# Patient Record
Sex: FEMALE | Race: WHITE
[De-identification: names, ages, dates, MRNs, and addresses within clinical notes are randomized per-mention and may not be internally consistent; named-entity substitution may affect disease eponyms.]

---

## 2018-01-11 ENCOUNTER — HOSPITAL ENCOUNTER (OUTPATIENT)
Dept: HOSPITAL 92 - LABBT | Age: 48
Discharge: HOME | End: 2018-01-11
Attending: OBSTETRICS & GYNECOLOGY
Payer: COMMERCIAL

## 2018-01-11 DIAGNOSIS — R10.31: ICD-10-CM

## 2018-01-11 DIAGNOSIS — N92.0: ICD-10-CM

## 2018-01-11 DIAGNOSIS — Z01.812: Primary | ICD-10-CM

## 2018-01-11 DIAGNOSIS — D25.9: ICD-10-CM

## 2018-01-11 LAB
HGB BLD-MCNC: 13.7 G/DL (ref 12–16)
MCH RBC QN AUTO: 30.2 PG (ref 27–31)
MCV RBC AUTO: 90.7 FL (ref 81–99)
PLATELET # BLD AUTO: 347 THOU/UL (ref 130–400)
PREGS CONTROL BACKGROUND?: (no result)
PREGS CONTROL BAR APPEAR?: YES
RBC # BLD AUTO: 4.54 MILL/UL (ref 4.2–5.4)
WBC # BLD AUTO: 6.2 THOU/UL (ref 4.8–10.8)

## 2018-01-11 PROCEDURE — 86900 BLOOD TYPING SEROLOGIC ABO: CPT

## 2018-01-11 PROCEDURE — 84703 CHORIONIC GONADOTROPIN ASSAY: CPT

## 2018-01-11 PROCEDURE — 85027 COMPLETE CBC AUTOMATED: CPT

## 2018-01-11 PROCEDURE — 86901 BLOOD TYPING SEROLOGIC RH(D): CPT

## 2018-01-11 PROCEDURE — 86850 RBC ANTIBODY SCREEN: CPT

## 2018-01-13 NOTE — HP
REASON FOR ADMISSION:  Menorrhagia and fibroids, status post endometrial ablation.

 

SCHEDULED PROCEDURE:  Total laparoscopic hysterectomy with right salpingo-oophorectomy and left salpi
ngectomy.

 

HISTORY OF PRESENT ILLNESS:  Ms. 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
 attendant.  She has had an endometrial ablation in the past and has persistent bleeding and menorrha
souleymane as well as pain from her fibroids.  Her fibroids enlarged to the point where they are pushing on 
her bladder and causing discomfort with intercourse.  She desires definitive surgical management.  Dandre troy also has a long history of right lower quadrant pain and desires prophylactic oophorectomy on the r
ight.

 

OB AND GYN HISTORY:   x2, SAB x1 G3, P2, negative Pap smear in , endometrial ablation in ,
 contraceptive is 's vasectomy.

 

PAST MEDICAL HISTORY:  Denies.

 

PAST SURGICAL HISTORY:  Ablation.

 

ALLERGIES:  Denies.

 

MEDICATIONS:  Indomethacin.

 

SOCIAL HISTORY:  Denies tobacco, alcohol, and IV drug abuse.

 

FAMILY HISTORY:  Noncontributory.

 

REVIEW OF SYSTEMS:  Noncontributory.

 

PHYSICAL EXAMINATION:

GENERAL:  White female in no acute distress.

VITAL SIGNS:  5 feet 6, 170, BMI 27, blood pressure 130/82.

HEENT:  Within normal limits.

LUNGS:  Clear to auscultation bilaterally.

HEART:  Regular rhythm.

BREASTS:  No masses bilaterally.

ABDOMEN:  Soft, nontender, no rebound or guarding.

PELVIC:  Vulva without lesions.  Vagina without discharge.  Cervix parous.  Uterus 14 weeks size with
 fundal fibroids on exam.  Adnexa without masses being able to be appreciated.

 

LABORATORY STUDIES:  Ultrasound reveals a uterus measuring 14 x 4 x 9.9 x 8 cm with fundal fibroids m
easuring 7 and 6 cm respectively and endometrial thickness of 6 mm, normal size right and left adnexa
 without free fluid.

 

IMPRESSION:  Persistent dysmenorrhea and menorrhagia, status post endometrial ablation on multiple le
iomyoma uteri.

 

PLAN:  Discussed with patient options.  The patient strongly desires laparoscopic hysterectomy.  Risk
 and benefits of morselization was discussed with patient.  We will plan on proceeding with total lap
aroscopic hysterectomy with right salpingo-oophorectomy.  Plan on placing the specimen into an abdomi
nal retrieval bag and pulling against the abdomen at the level of a Pfannenstiel incision and making 
in approximately 2-3 cm incision and morcellating the uterus and fibroids out through that incision. 
 The patient understands the possible need to proceed to abdominal procedure because of size and bulk
iness of the uterus as well as the regular risks of surgery including bleeding, infection, and damage
 to pelvic organs.  We will administer appropriate antibiotic and DVT prophylaxis.  Anticipate 22-adenike
r observation status.

## 2018-01-16 ENCOUNTER — HOSPITAL ENCOUNTER (INPATIENT)
Dept: HOSPITAL 92 - SDC | Age: 48
LOS: 1 days | Discharge: HOME | DRG: 743 | End: 2018-01-17
Attending: OBSTETRICS & GYNECOLOGY | Admitting: OBSTETRICS & GYNECOLOGY
Payer: COMMERCIAL

## 2018-01-16 VITALS — BODY MASS INDEX: 27.9 KG/M2

## 2018-01-16 DIAGNOSIS — N94.6: ICD-10-CM

## 2018-01-16 DIAGNOSIS — D25.9: Primary | ICD-10-CM

## 2018-01-16 DIAGNOSIS — N80.1: ICD-10-CM

## 2018-01-16 PROCEDURE — 0UT74ZZ RESECTION OF BILATERAL FALLOPIAN TUBES, PERCUTANEOUS ENDOSCOPIC APPROACH: ICD-10-PCS | Performed by: OBSTETRICS & GYNECOLOGY

## 2018-01-16 PROCEDURE — 8E0W4CZ ROBOTIC ASSISTED PROCEDURE OF TRUNK REGION, PERCUTANEOUS ENDOSCOPIC APPROACH: ICD-10-PCS | Performed by: OBSTETRICS & GYNECOLOGY

## 2018-01-16 PROCEDURE — 0UT04ZZ RESECTION OF RIGHT OVARY, PERCUTANEOUS ENDOSCOPIC APPROACH: ICD-10-PCS | Performed by: OBSTETRICS & GYNECOLOGY

## 2018-01-16 PROCEDURE — 0UT94ZZ RESECTION OF UTERUS, PERCUTANEOUS ENDOSCOPIC APPROACH: ICD-10-PCS | Performed by: OBSTETRICS & GYNECOLOGY

## 2018-01-16 PROCEDURE — 85027 COMPLETE CBC AUTOMATED: CPT

## 2018-01-16 PROCEDURE — 36415 COLL VENOUS BLD VENIPUNCTURE: CPT

## 2018-01-16 PROCEDURE — 88307 TISSUE EXAM BY PATHOLOGIST: CPT

## 2018-01-16 PROCEDURE — A4216 STERILE WATER/SALINE, 10 ML: HCPCS

## 2018-01-16 RX ADMIN — HYDROCODONE BITARTRATE AND ACETAMINOPHEN PRN TAB: 5; 325 TABLET ORAL at 17:01

## 2018-01-16 RX ADMIN — HYDROCODONE BITARTRATE AND ACETAMINOPHEN PRN TAB: 5; 325 TABLET ORAL at 21:21

## 2018-01-16 RX ADMIN — MORPHINE SULFATE PRN MG: 4 INJECTION, SOLUTION INTRAMUSCULAR; INTRAVENOUS at 14:28

## 2018-01-16 RX ADMIN — Medication SCH: at 21:19

## 2018-01-16 RX ADMIN — MORPHINE SULFATE PRN MG: 4 INJECTION, SOLUTION INTRAMUSCULAR; INTRAVENOUS at 16:24

## 2018-01-16 NOTE — OP
DATE OF OPERATION:  01/16/2018

 

PREOPERATIVE DIAGNOSES:  Large uterine fibroids, dysmenorrhea, pelvic pain, right lower quadrant.

 

POSTOPERATIVE DIAGNOSIS:  Large uterine fibroids, dysmenorrhea, pelvic pain, right lower quadrant, pl
us right ovarian endometriosis.

 

PROCEDURE:  Total laparoscopic hysterectomy, right salpingo-oophorectomy,  left salpingectomy and ext
ra-abdominal morselization.

 

SURGEON:  Pablo Willingham M.D.

 

ASSISTANT:  Sancho Cleaning D.O.

 

ANESTHESIA:  General endotracheal.

 

ESTIMATED BLOOD LOSS:  200 mL.

 

MEDICATIONS:  Two grams Ancef preincision.

 

DVT PROPHYLAXIS:  SCDs.

 

OPERATIVE FINDINGS:

1.  Approximately 14-16 week sized bulky uterus with 2 large fibroids in the fundus side by side.

2.  Mild to moderate amount of endometriosis on the surface of the right ovary.

3.  Normal appearing left ovary.

4.  Hemostasis with clear urine.

5.  Counts correct at the end of the procedure.

 

DISPOSITION:  To the recovery room in good condition.

 

DESCRIPTION OF OPERATIVE PROCEDURE:  After obtaining proper informed consent, the patient was taken t
o the operating room where general endotracheal anesthesia was achieved without difficulty.  The yamileth
ent was prepped and draped in dorsal lithotomy position in Mickey stirrups.  Side hand speculum placed
 in vagina.  The cervix was identified and grasped with tooth tenaculum.  Uterus sounded to 10 cm.  R
REBECA manipulator with a 3.5 vaginal  and 8 cm occluding obturator was placed without difficu
lty.  Porter catheter was placed.  Speculum and tenaculum removed.   changed his gloves and tu
rned his attention to the abdominal portion of the procedure.  Five mL of Marcaine was injected at th
e superior aspect of the umbilicus and a 12 mm skin incision made.  Veress needle placed inside the a
bdominal cavity.  Confirmation of entry into the peritoneal cavity via saline drop test.  Insufflatio
n carried out with carbon dioxide to a max pressure of 15, volume approximately 3.5 liters.  A 12 mm 
trocar was then placed and confirmation of entry into the peritoneal cavity without trauma to underly
ing viscera was noted with the laparoscopic camera.  Right and left lateral trocars for da Clau robo
t 8 mm were placed under direct visualization and an 11 mm assistant port in the right upper quadrant
 was placed.  The patient was placed in steep Trendelenburg position and the da Clau robot was docke
d with monopolar scissors in the right hand and fenestrated bipolar forceps in the left.  Uterus was 
noted to be large and bulky, but was able to be manipulated around for care.  On the patient's right,
 the infundibulopelvic ligament was identified and noted to be well away from the sidewall and coagul
ated at the level of the ovary.  After the IP was coagulated and transected, coagulation transected a
nd was taken to the broad and the round, and then to the cardinal ligaments down to just above the le
kayode of the internal cervical os.  The uterus was mobilized anteriorly.  The vesicouterine peritoneum 
was identified.  The vaginal  on the GUIDO was identified and incised sharply and dissected 
off the lower uterine segment, cervix, and upper vagina.  Coagulation and transection were carried ou
t at the level of the uterine vessels on the patient's right,  marked varicosities secondary to the p
attrey's large fibroids were noted which rendered some difficulty in this; however, it was accomplish
ed, sustained midline against the cervix well away from the pelvic side wall and the ureter.  After t
his was accomplished, attention was turned to the patient's left where the identical procedure was ca
rried out except on this side, the fallopian tube was identified, the mesosalpinx coagulated and aguilar
sected and excised and the tube pulled out through the assistant port.  Then, the utero-ovarian ligam
ent was coagulated and transected, carried to the broad, the round, and the cardinals down to just ab
ove the level of the internal cervical os.  Completion of the incision to the vesicouterine peritoneu
m anteriorly was carried out.  Skeletonization of the uterine vessels carried out, again marked varic
osities were encountered on this side.  Good hemostasis was achieved, maintaining midline discipline 
and staying away from the lateral sidewall and ureter.  Posteriorly, the vaginal  was ident
ified and the peritoneum incised sharply.  Posteriorly, the midline at 6 o'clock and extended from 6 
to 3 and from 6 to 9.  Anteriorly, good dissection of the bladder off the upper vagina was identified
, the incision was made which was carried down into the vagina at 12 o'clock and was extended from 12
 to 9, 12 to 3, 6 to 9 and 6 to 3, achieving hemostasis with bipolar coagulation as these pedicles we
re taken down.  Good hemostasis was noted along the margins of the cuff after removal of the cervix f
rom the upper vagina.  The specimen was large enough that it could not be passed out through the vagi
na.  The GUIDO manipulator obturator balloon was taken down and the specimen was pulled off of this an
d placed in the upper abdomen for future retrieval.  The applied medical morselization retrieval bag 
was placed in the vagina, tied in the middle with a silk suture and then the vagina occluded to maint
ain pneumoperitoneum.  Using a Stratafix 2-0 PDS the vagina was closed in a running continuous manner
 from right to left, achieving good hemostasis.  The bladder was back filled to identify its location
 and closure was careful to stay away from this.  Good hemostasis was noted after closure and the ped
icles were irrigated and noted be hemostatic.  FloSeal was applied upon all surfaces and good hemosta
sis was noted.  At this point in time, the closure suture on the morselization bag was cut in the mid
dle and removed.  The uterine specimen was placed into the retrieval bag and a 12 mm trocar was place
d suprapubically in the midline 2 cm above the symphysis pubis.  The retrieval straining on the juan
lization bag was pulled out through this trocar.  The da Clau instruments were then removed.  The da
 Clau undocked and laparoscopic trocars except for the 12 mm suprapubic were all removed.  The patie
nt was flattened and the incision at the level of the 12 mm trocar was extended to approximately 4 cm
 in greatest length  through the skin and carried down to the fascia which was incised sharply latera
lly with curved Lal scissors enlarging the incision.  The retrieval string and the mouth of the mors
elization bag were pulled out through this incision and a small Oj O was placed inside of it.  Th
e applied medical morselization bag was then pulled out through the middle of the Oj O and rolled
 down to facilitate morselization of the uterus.  A hard plastic protective ring was placed inside of
 this and the uterus was grasped and morcellated out in approximately 270 degree rotating technique u
sing an 11 blade and taking care to avoid trauma to the underlying viscera.  This procedure was pilo
ed out for approximately 45-50 minutes, completely removing the specimen, including right tube and ov
micky as well as the uterus.  The retrieval bag was pulled out.  The Oj O was left inside.  Inspect
ion of the pelvis was carried out.  Suction irrigation and blood from the surgical procedure were suc
tioned out and inspection of the surgical field through the 4 cm incision was carried out which revea
led good hemostasis.  No pooling of blood, no evidence of active hemorrhage.  The Oj 0 retractor 
was removed.  The fascia reapproximated at the level of the 4 cm incision using a 0 Vicryl on a CT2 n
eedle, UR-5 needle was used to close the fascia at the level of the umbilical trocar.  Skin was reapp
roximated x5 using subcuticular 4-0 Monocryl and Dermabond.  Inspection of the vagina afterwards reve
aled the cuff to be intact and no active bleeding.  Urine was clear.  The patient was awakened, extub
ated, and taken to the recovery room in good condition.

## 2018-01-17 VITALS — DIASTOLIC BLOOD PRESSURE: 71 MMHG | TEMPERATURE: 98.5 F | SYSTOLIC BLOOD PRESSURE: 118 MMHG

## 2018-01-17 LAB
HGB BLD-MCNC: 11.3 G/DL (ref 12–16)
MCH RBC QN AUTO: 30.6 PG (ref 27–31)
MCV RBC AUTO: 91.2 FL (ref 81–99)
PLATELET # BLD AUTO: 286 THOU/UL (ref 130–400)
RBC # BLD AUTO: 3.71 MILL/UL (ref 4.2–5.4)
WBC # BLD AUTO: 10.9 THOU/UL (ref 4.8–10.8)

## 2018-01-17 RX ADMIN — HYDROCODONE BITARTRATE AND ACETAMINOPHEN PRN TAB: 5; 325 TABLET ORAL at 03:31

## 2018-01-17 RX ADMIN — Medication SCH: at 07:50

## 2018-01-17 RX ADMIN — HYDROCODONE BITARTRATE AND ACETAMINOPHEN PRN TAB: 5; 325 TABLET ORAL at 08:04

## 2018-01-17 NOTE — DIS
DATE OF ADMISSION:  01/16/2018

 

DATE OF DISCHARGE:  01/17/2018

 

SUMMARY OF HOSPITAL COURSE:  Ms. Whittaker was admitted with an approximately 15-16 week size uterus
 with multiple leiomyoma, dysmenorrhea and menorrhagia.  She underwent a total laparoscopic hysterect
kina, right salpingo-oophorectomy, left salpingectomy with extra-abdominal morcellation with an approx
imately 200-300 mL estimated blood loss.  She had an unremarkable postoperative course with good urin
e output, afebrile with stable vital signs, hematocrit 33% postoperative day #01.  Abdomen was soft a
nd nontender without rebound or guarding.  Her incisions were well healed.  Her perineum was dry and 
her extremities without clubbing, cyanosis or edema.  She will be discharged home on Norco and Motrin
 and will follow up at [facility] [place] in 6 weeks.  Pathology pending.

## 2018-01-24 ENCOUNTER — HOSPITAL ENCOUNTER (INPATIENT)
Dept: HOSPITAL 92 - 3SE | Age: 48
LOS: 5 days | Discharge: HOME | DRG: 856 | End: 2018-01-29
Attending: OBSTETRICS & GYNECOLOGY | Admitting: OBSTETRICS & GYNECOLOGY
Payer: COMMERCIAL

## 2018-01-24 VITALS — BODY MASS INDEX: 25.5 KG/M2

## 2018-01-24 DIAGNOSIS — K65.1: ICD-10-CM

## 2018-01-24 DIAGNOSIS — Z90.79: ICD-10-CM

## 2018-01-24 DIAGNOSIS — Z90.710: ICD-10-CM

## 2018-01-24 DIAGNOSIS — T81.4XXA: Primary | ICD-10-CM

## 2018-01-24 DIAGNOSIS — L02.211: ICD-10-CM

## 2018-01-24 DIAGNOSIS — L76.32: ICD-10-CM

## 2018-01-24 DIAGNOSIS — Z90.721: ICD-10-CM

## 2018-01-24 DIAGNOSIS — R03.0: ICD-10-CM

## 2018-01-24 DIAGNOSIS — F41.9: ICD-10-CM

## 2018-01-24 DIAGNOSIS — B96.20: ICD-10-CM

## 2018-01-24 DIAGNOSIS — L03.311: ICD-10-CM

## 2018-01-24 LAB
ALBUMIN SERPL BCG-MCNC: 4.3 G/DL (ref 3.5–5)
ALP SERPL-CCNC: 263 U/L (ref 40–150)
ALT SERPL W P-5'-P-CCNC: 43 U/L (ref 8–55)
ANION GAP SERPL CALC-SCNC: 20 MMOL/L (ref 10–20)
AST SERPL-CCNC: 19 U/L (ref 5–34)
BILIRUB SERPL-MCNC: 0.4 MG/DL (ref 0.2–1.2)
BUN SERPL-MCNC: 12 MG/DL (ref 7–18.7)
CALCIUM SERPL-MCNC: 10.5 MG/DL (ref 7.8–10.44)
CHLORIDE SERPL-SCNC: 102 MMOL/L (ref 98–107)
CO2 SERPL-SCNC: 19 MMOL/L (ref 22–29)
CREAT CL PREDICTED SERPL C-G-VRATE: 105 ML/MIN (ref 70–130)
GLOBULIN SER CALC-MCNC: 4.4 G/DL (ref 2.4–3.5)
GLUCOSE SERPL-MCNC: 87 MG/DL (ref 70–105)
HGB BLD-MCNC: 12.6 G/DL (ref 12–16)
HYALINE CASTS #/AREA URNS LPF: (no result) LPF
MCH RBC QN AUTO: 30 PG (ref 27–31)
MCV RBC AUTO: 90.7 FL (ref 81–99)
MDIFF COMPLETE?: YES
PLATELET # BLD AUTO: 513 THOU/UL (ref 130–400)
PLATELET BLD QL SMEAR: (no result)
POTASSIUM SERPL-SCNC: 3.7 MMOL/L (ref 3.5–5.1)
RBC # BLD AUTO: 4.19 MILL/UL (ref 4.2–5.4)
RBC UR QL AUTO: (no result) HPF (ref 0–3)
SODIUM SERPL-SCNC: 137 MMOL/L (ref 136–145)
SP GR UR STRIP: 1.02 (ref 1–1.03)
WBC # BLD AUTO: 11.8 THOU/UL (ref 4.8–10.8)
WBC UR QL AUTO: (no result) HPF (ref 0–3)

## 2018-01-24 PROCEDURE — 87205 SMEAR GRAM STAIN: CPT

## 2018-01-24 PROCEDURE — 87076 CULTURE ANAEROBE IDENT EACH: CPT

## 2018-01-24 PROCEDURE — 85027 COMPLETE CBC AUTOMATED: CPT

## 2018-01-24 PROCEDURE — 87040 BLOOD CULTURE FOR BACTERIA: CPT

## 2018-01-24 PROCEDURE — 74177 CT ABD & PELVIS W/CONTRAST: CPT

## 2018-01-24 PROCEDURE — 87186 SC STD MICRODIL/AGAR DIL: CPT

## 2018-01-24 PROCEDURE — 80053 COMPREHEN METABOLIC PANEL: CPT

## 2018-01-24 PROCEDURE — A4216 STERILE WATER/SALINE, 10 ML: HCPCS

## 2018-01-24 PROCEDURE — 74178 CT ABD&PLV WO CNTR FLWD CNTR: CPT

## 2018-01-24 PROCEDURE — 87070 CULTURE OTHR SPECIMN AEROBIC: CPT

## 2018-01-24 PROCEDURE — 87086 URINE CULTURE/COLONY COUNT: CPT

## 2018-01-24 PROCEDURE — 85025 COMPLETE CBC W/AUTO DIFF WBC: CPT

## 2018-01-24 PROCEDURE — 87077 CULTURE AEROBIC IDENTIFY: CPT

## 2018-01-24 PROCEDURE — 87149 DNA/RNA DIRECT PROBE: CPT

## 2018-01-24 PROCEDURE — 85007 BL SMEAR W/DIFF WBC COUNT: CPT

## 2018-01-24 PROCEDURE — 80048 BASIC METABOLIC PNL TOTAL CA: CPT

## 2018-01-24 PROCEDURE — 87631 RESP VIRUS 3-5 TARGETS: CPT

## 2018-01-24 PROCEDURE — 81001 URINALYSIS AUTO W/SCOPE: CPT

## 2018-01-24 PROCEDURE — 36415 COLL VENOUS BLD VENIPUNCTURE: CPT

## 2018-01-24 RX ADMIN — HYDROCODONE BITARTRATE AND ACETAMINOPHEN PRN TAB: 5; 325 TABLET ORAL at 19:55

## 2018-01-24 RX ADMIN — METRONIDAZOLE SCH MLS: 500 INJECTION, SOLUTION INTRAVENOUS at 19:50

## 2018-01-24 RX ADMIN — HYDROCODONE BITARTRATE AND ACETAMINOPHEN PRN TAB: 5; 325 TABLET ORAL at 16:40

## 2018-01-24 NOTE — CT
CT ABDOMEN WITH AND WITHOUT CONTRAST 

CT PELVIS WITH AND WITHOUT CONTRAST:

(CT cystogram)

1/24/18

 

HISTORY: 

47-year-old female status post hysterectomy on 1/17/18. Urinary retention. 

 

The findings were discussed by telephone with Dr. Pablo Willingham by telephone at the time of this dict
ation. 

 

TECHNIQUE:  

IV contrast: 100 mL Isovue 370.

Noncontrast scan of entire abdomen and pelvis. 

After IV contrast injection, 90 second delayed, followed by four minute delayed, scans of entire abdo
men and pelvis. 

Bladder drained via existing Porter catheter, followed by post drainage scan of the pelvis. 

Coronal reconstructions of the 90 second delayed scan.

 

FINDINGS:  

The uterus is absent. Extensive fat stranding representing edema throughout the pelvic cavity. There 
is a very irregularly shaped collection of fluid and gas located centrally within the pelvic cavity, 
with irregular, enhancing walls. The dimensions are difficult to measure because of its very irregula
r shape. It is roughly 7 cm transverse x 2 cm anteroposterior x 2.5 cm craniocaudal. There are anteri
or and posterior extensions of this collection at its left side. There are adjacent much smaller tashia
ections of gas and fluid, some of which represent intraluminal contents within bowel loops, but some 
of which questionably represent additional extraluminal small extraluminal collections. 

 

There is an approximately 4.5 x 2 x 1.5 cm intermediate density fluid collection within the vaginal c
uff, with surrounding enhancing walls. 

 

There is a Porter catheter within an initially decompressed urinary bladder. On the four minute delaye
d images, the bladder partially fills with iodinated contrast. There is no evidence of extravasation 
of iodinated contrast seen on the four minute delayed images or in the post drainage images. 

 

There is an approximately 0.5 x 0.3 cm calculus at the lower pole of the left kidney adjacent to whic
h there is a lateral renal parenchymal defect representing scar from previous insult. 

 

There is no acute, currently active pyelonephritis. There is no hydroureteronephrosis. No calculus in
 the right kidney. The liver, abdominal aorta, right kidney, adrenals, pancreas, and spleen, are norm
al. No small bowel dilation. The small bowel and rectosigmoid colon within the pelvic cavity are diff
icult to evaluate because of the edematous changes throughout the pelvic cavity as described above. L
anahi bases are clear. 

 

Anterior to the inferior portion of the rectus abdominis muscle, in the subcutaneous fat, there is an
 approximately 2.5 x 1.5 x 4.5 cm collection without enhancing walls, but with surrounding fat strand
ing, and containing some gas. This is consistent with the area of induration at the site of surgical 
trocar insertion, and is consistent with a hematoma. It is uncertain whether or not this is infected.


 

IMPRESSION:  

1.      Status post recent hysterectomy. 

2.      Moderate sized, very irregularly shaped fluid collection centrally within the pelvic cavity, 
containing moderate amount of gas. This may represent postsurgical hematoma, but there is a possibili
ty that this could represent an abscess. 

3.      Much smaller collection in the anterior subcutaneous fat superficial to the lower portion of 
the rectus abdominis muscle, probably representing a superficial hematoma, which may or may not be in
fected, probably in the region of surgical trocar insertion.

4.      Although the bladder walls are diffusely thickened, there is no evidence of leakage; no evide
nce of iatrogenic traumatic injury to the urinary bladder or distal ureters. 

5.      Incidental finding of left renal lower pole calculus with adjacent renal parenchymal scar. 

 

 

Code CR

 

POS: RALPH

## 2018-01-24 NOTE — PRG
DATE OF SERVICE:  01/24/2018

 

TIME OF SERVICE:  1710 hours.

 

SUBJECTIVE:  A CT scan was reviewed by myself and discussed with Dr. De Dios.  The patient's lab work and
 vitals have been reviewed.  It is evident that the patient has a small irregular area that is most c
onsistent with an early abscess forming in a hematoma or collection of fluid in the pelvis posthyster
ectomy along with perhaps an early subcutaneous cellulitis/abscess at the level of her suprapubic inc
ision site.  She also incidentally has a left kidney stone associated with scarring that is evident o
f its longstanding presence there.  It is unlikely the kidney stone is contributing to her current is
sues.  Discussed with the patient and her  options including expectant management with IV anti
biotics versus surgically addressing and IV antibiotics.  Radiology does not think they can reach thi
s abscess percutaneously.  We will proceed with exploration, I&D at the level of her suprapubic site 
and then intra-abdominal pelvic washout tomorrow at noon.  We will allow the patient to eat this even
ing.  We will continue Porter drainage.  We will change antibiotics from Levaquin to Zosyn and Flagyl.
  The patient and  are understanding of this.  We will initiate SCDs for DVT prophylaxis.

## 2018-01-24 NOTE — HP
DATE OF ADMISSION:  2018

 

TIME OF ADMISSION:  Approximately 1300 hours.

 

REASON FOR ADMISSION:  Fever, possible UTI, possible postoperative complication, approximately 8 days
 status post total laparoscopic hysterectomy.

 

HISTORY OF PRESENT ILLNESS:  Ms. Whittaker is a 47-year-old  3, para 2, AB 1, who was admitte
d on 2018 for a TLH, RSO, left salpingectomy, and morselization of the uterus.  She had a surge
ry that was somewhat difficult secondary to the bulky nature of the fundus of her uterus as well as l
arge vessels supplying her fibroids with the uterines on both sides.  However, midline integrity was 
felt to have been maintained with the surgical procedure throughout and that she had uncomplicated 24
-hour observation in the hospital postoperatively.  She was discharged home and reports feeling good 
until approximately 4-5 days postoperatively, when she began to have fevers, chills, and vaginal pain
.  She also reports that she cannot hold her urine well, although she is able to hold her urine and i
s voiding.  She went to the emergency room in [place] and was seen and noted to have leukocytes with 
nitrites in her urine, white count of 15,000 with a left shift and was started on antibiotics.  I am 
uncertain of exactly what antibiotics at this time.  She reported on Monday to my office checked on h
er (2 days ago that she felt much better on the antibiotics); however, yesterday she began having fev
er again as well.  She was scheduled to come to the office this morning; however, I called the patien
t and discussed with her.  She lives over 2 hours away in [place] and we decided to go ahead and a
dmit her for further evaluation as I am concerned about the possibility of urinary tract injury, willian
cially thermal injury with this timing of the onset of this issue.

 

OB AND GYN HISTORY:   x2.  SAB x1.  Negative Pap in 2015.  Endometrial ablation in .

 

PAST MEDICAL HISTORY:  None.

 

PAST SURGICAL HISTORY:  Ablation and hysterectomy.

 

ALLERGIES:  Denies.

 

MEDICATIONS:  Delia.

 

SOCIAL HISTORY:  Denies tobacco, alcohol, or drug abuse.

 

FAMILY HISTORY:  Noncontributory.

 

REVIEW OF SYSTEMS:  Noncontributory.

 

PHYSICAL EXAMINATION:

GENERAL:  White female with moderate pain, but not in acute distress at this time.

VITAL SIGNS:  Temperature 99.0, pulse 84, respirations 20, blood pressure 154/90.

HEENT:  Within normal limits.

LUNGS:  Clear to auscultation bilaterally.

HEART:  Regular rhythm.

BREASTS:  No masses bilaterally.

ABDOMEN:  Soft and nontender without rebound or guarding, except in the area of her suprapubic remova
l of fibroids site where there is some induration and erythema at the superior aspect.  Bowel sounds 
are noted throughout.  The other trocars appeared normal.  The patient seems to have possibly mild CV
A tenderness on her right.

PELVIC:  Deferred.

EXTREMITIES:  Without clubbing, cyanosis or edema.

 

LABORATORY AND X-RAY FINDINGS:  White count is 11.8 with hematocrit of 38 (postoperative day #1 was 3
4), platelet count is elevated at 513, neutrophil are 75%, 6% bands, 13% lymphocytes.  Comprehensive 
metabolic panel is remarkable only for an elevated alkaline phosphate at 263, elevated total serum pr
otein at 8.7, upper limits of normal at 8.3 and albumin globulin ratio of 1.0.  Globulin is elevated 
at 4.4, upper limits of normal at 3.5.  Urinalysis from cath reveals 40+ ketones, trace blood, small 
bilirubin, negative nitrates, negative leukocyte esterase, negative wbc's, negative protein, specific
 gravity of 1.025 and a pH of 6.5.

 

IMAGING:  CT scan is pending at this time.  Both a CT urogram and cystogram were ordered.

 

IMPRESSION AND PLAN:  Fever of uncertain origin approximately 8 days post-surgical.  Initial consider
ation was worry for urinary tract injury.  Current laboratory values seemed to be less suggestive of 
this, although CT scan results are pending.  The possibility of cellulitis, pelvic abscess, influenza
, or other cause of fever and malaise need to be considered.  The patient had minimal blood loss and 
no significant active bleeding noted at the time of closure and does not have low enough hematocrit s
uggested the patient has continued intraabdominal hemorrhage.  We will follow up CT results, I have s
tarted the patient on Levaquin assuming urinary tract source of the infection, we will consult Urolog
y if CT findings indicate a possible urologic injury, and otherwise will further evaluate laboratory 
and CT results.  Urine cultures and blood cultures have been sent.

## 2018-01-25 PROCEDURE — 0J980ZZ DRAINAGE OF ABDOMEN SUBCUTANEOUS TISSUE AND FASCIA, OPEN APPROACH: ICD-10-PCS | Performed by: OBSTETRICS & GYNECOLOGY

## 2018-01-25 PROCEDURE — 0W9G0ZZ DRAINAGE OF PERITONEAL CAVITY, OPEN APPROACH: ICD-10-PCS | Performed by: OBSTETRICS & GYNECOLOGY

## 2018-01-25 RX ADMIN — METRONIDAZOLE SCH MLS: 500 INJECTION, SOLUTION INTRAVENOUS at 05:18

## 2018-01-25 RX ADMIN — Medication SCH: at 09:09

## 2018-01-25 RX ADMIN — MORPHINE SULFATE PRN MG: 5 INJECTION, SOLUTION INTRAMUSCULAR; INTRAVENOUS at 19:06

## 2018-01-25 RX ADMIN — MORPHINE SULFATE PRN MG: 5 INJECTION, SOLUTION INTRAMUSCULAR; INTRAVENOUS at 17:21

## 2018-01-25 RX ADMIN — HYDROCODONE BITARTRATE AND ACETAMINOPHEN PRN TAB: 5; 325 TABLET ORAL at 19:50

## 2018-01-25 RX ADMIN — METRONIDAZOLE SCH: 500 INJECTION, SOLUTION INTRAVENOUS at 14:26

## 2018-01-25 RX ADMIN — Medication SCH: at 23:29

## 2018-01-25 RX ADMIN — METRONIDAZOLE SCH MLS: 500 INJECTION, SOLUTION INTRAVENOUS at 19:52

## 2018-01-25 NOTE — OP
DATE OF PROCEDURE:  01/25/2018

 

PREOPERATIVE DIAGNOSES:  Suprapubic midline incision cellulitis with hematoma and pelvic abscess/hema
kristian, 8-day status post total laparoscopic hysterectomy.

 

POSTOPERATIVE DIAGNOSES:  Suprapubic midline incision cellulitis with hematoma and pelvic abscess/hem
atoma, 8-day status post total laparoscopic hysterectomy.

 

PROCEDURE PERFORMED:  Exploration with I&D of subcutaneous abscess and pelvic washout of abscess.

 

SURGEON:  Pablo Willingham M.D.

 

ASSISTANT:  Britta Fields M.D.

 

ANESTHESIA:  Pj Carty M.D.

 

ESTIMATED BLOOD LOSS:  50 mL intraoperatively.

 

COMPLICATIONS:  None.

 

DRAINS:  Porter to gravity.

 

DVT PROPHYLAXIS:  SCDs.

 

CULTURES:  Subcutaneous abscess x1 and pelvic abscess x1.

 

FINDINGS:

1.  Small 1-2 cm diameter pocket of old blood with purulent appearance, deep subcutaneous just above 
the suprapubic incision above the fascia cultured.

2.  Mildly loculated pelvic fluid collection with serosanguineous appearance with some purulence evac
uated.

3.  Hemostasis with correct counts at the end of the procedure.

 

DISPOSITION:  To the recovery room in good condition.

 

DESCRIPTION OF OPERATIVE PROCEDURE:  After obtaining proper informed consent, the patient was taken t
o the operating room.  Porter catheter was in situ.  The patient was prepped and draped and Dermabond 
was removed prior to prep from the skin at the level of the suprapubic incision.  This incision was o
pened up.  Of note, it was noted to be well healing and was difficult to open, it was opened up along
 its entirety and taken down to the level of fascia just above the fascia.  Superiorly, there was a p
ocket of dark fluid with purulent appearance to it.  This was cultured and evacuated.  The fascial cl
osure from the previous surgery was identified with Vicryl suture and the suture removed.  Fascia ope
caleb and grasped with Oschner clamps on each edge.  The peritoneum was identified, elevated and incise
d sharply entering the peritoneal cavity and taking care to avoid trauma to the underlying viscera.  
A small Oj O retractor was placed inside.   placed the patient in slight Trendelenburg po
sition and I used fingers to push the bowel back out of the way.  Loculations of fluid collection of 
serosanguineous fluid with slight odor and purulent appearance was noted in the pelvis, this was brok
en up and irrigation carried out with approximately 3 liters of saline after obtaining a culture from
 this area.  The left adnexa were identified and noted to be without evidence of gross infection or s
ignificant inflammation.  No evidence of bowel injury was noted and at no point in time was any fecal
 material noted during the suction irrigation or inspection cycle.  After this was carried out, no ev
idence of active bleeding in the pelvis was identified.  At this point in time, the Oj O retracto
r was removed.  Consideration was made towards placement of MANDO drain in the pelvis; however, decision
 was made because of very small size of the incision and the difficulty to access, and quick clearing
 of the fluid without evidence of extensive infection, to not place a MANDO drain.  The Oj O retract
or was removed and the rectus was inspected and noted to be dry.  The fascia was reapproximated in ru
nning continuous 0 PDS suture.  The subcutaneous tissue was irrigated and rendered hemostatic using B
ovie cautery.  Because of the infection that have been present in this area, decision was made to not
 close the subcu and also not to use a subcuticular stitch with Dermabond.  Instead, staples were use
d with intervening Telfa caleb placed with a 4x4, sponge dressing placed after this.  Urine was noted
 to be clear afterwards.  The patient was awakened, extubated, and taken to recovery room in good con
dition.  We will continue Zosyn and Flagyl as previously prescribed for the patient.

## 2018-01-26 RX ADMIN — HYDROCODONE BITARTRATE AND ACETAMINOPHEN PRN TAB: 5; 325 TABLET ORAL at 11:15

## 2018-01-26 RX ADMIN — HYDROCODONE BITARTRATE AND ACETAMINOPHEN PRN TAB: 5; 325 TABLET ORAL at 16:05

## 2018-01-26 RX ADMIN — Medication SCH: at 08:08

## 2018-01-26 RX ADMIN — METRONIDAZOLE SCH MLS: 500 INJECTION, SOLUTION INTRAVENOUS at 20:43

## 2018-01-26 RX ADMIN — HYDROCODONE BITARTRATE AND ACETAMINOPHEN PRN TAB: 5; 325 TABLET ORAL at 20:45

## 2018-01-26 RX ADMIN — METRONIDAZOLE SCH MLS: 500 INJECTION, SOLUTION INTRAVENOUS at 04:00

## 2018-01-26 RX ADMIN — HYDROCODONE BITARTRATE AND ACETAMINOPHEN PRN TAB: 5; 325 TABLET ORAL at 05:50

## 2018-01-26 RX ADMIN — METRONIDAZOLE SCH MLS: 500 INJECTION, SOLUTION INTRAVENOUS at 11:35

## 2018-01-26 RX ADMIN — HYDROCODONE BITARTRATE AND ACETAMINOPHEN PRN TAB: 5; 325 TABLET ORAL at 00:01

## 2018-01-26 NOTE — PRG
DATE OF SERVICE:  01/26/2018

 

TIME OF SERVICE:  1140 hours.

 

SUBJECTIVE:  The patient reports her pain is much improved.  She feels much better, has a good appeti
te and is voiding with ease.  Vitals reveal a T-max of 99.6, pulse 68, respirations 16, blood pressur
e 107/68.  The patient's urine output with Porter prior to removal was 2500 mL.  It has now been remov
ed and she is voiding with ease.  She reports no vaginal discharge.

 

PHYSICAL EXAMINATION:

GENERAL:  Patient is resting comfortably in no apparent distress.

ABDOMEN:  Soft and nontender, no rebound or guarding.  Active bowel sounds.  Bandage is in situ which
 are still in place in her suprapubic incision.  There is no increase in the size of the serosanguine
ous fluid area on the 4x4's from greater than 12 hours ago.  Perineum is dry.

EXTREMITIES:  Without clubbing, cyanosis, or edema.

 

LABORATORY DATA:  Both incisional and abdominal cultures are preliminary rare to moderate gram-negati
ve rods, which is consistent with suspected bacterial milieu with her post hysterectomy abscess.

 

IMPRESSION:  Status post incisional and pelvic hematoma/abscess approximately 1-1/2 weeks post total 
laparoscopic hysterectomy.

 

PLAN:  Continue Zosyn and Flagyl, initiate Colace for constipation.  We will remove Telfa caleb from 
incision tomorrow.  We will recheck CBC and metabolic panel tomorrow.

## 2018-01-27 LAB
ANION GAP SERPL CALC-SCNC: 12 MMOL/L (ref 10–20)
BASOPHILS # BLD AUTO: 0.1 THOU/UL (ref 0–0.2)
BASOPHILS NFR BLD AUTO: 0.7 % (ref 0–1)
BUN SERPL-MCNC: 7 MG/DL (ref 7–18.7)
CALCIUM SERPL-MCNC: 8.9 MG/DL (ref 7.8–10.44)
CHLORIDE SERPL-SCNC: 102 MMOL/L (ref 98–107)
CO2 SERPL-SCNC: 27 MMOL/L (ref 22–29)
CREAT CL PREDICTED SERPL C-G-VRATE: 126 ML/MIN (ref 70–130)
EOSINOPHIL # BLD AUTO: 0.1 THOU/UL (ref 0–0.7)
EOSINOPHIL NFR BLD AUTO: 1.4 % (ref 0–10)
GLUCOSE SERPL-MCNC: 94 MG/DL (ref 70–105)
HGB BLD-MCNC: 9.9 G/DL (ref 12–16)
LYMPHOCYTES # BLD: 1.6 THOU/UL (ref 1.2–3.4)
LYMPHOCYTES NFR BLD AUTO: 14.9 % (ref 21–51)
MCH RBC QN AUTO: 29.5 PG (ref 27–31)
MCV RBC AUTO: 91.1 FL (ref 81–99)
MONOCYTES # BLD AUTO: 0.5 THOU/UL (ref 0.11–0.59)
MONOCYTES NFR BLD AUTO: 5 % (ref 0–10)
NEUTROPHILS # BLD AUTO: 8.4 THOU/UL (ref 1.4–6.5)
NEUTROPHILS NFR BLD AUTO: 78.1 % (ref 42–75)
PLATELET # BLD AUTO: 527 THOU/UL (ref 130–400)
POTASSIUM SERPL-SCNC: 4 MMOL/L (ref 3.5–5.1)
RBC # BLD AUTO: 3.34 MILL/UL (ref 4.2–5.4)
SODIUM SERPL-SCNC: 137 MMOL/L (ref 136–145)
WBC # BLD AUTO: 10.7 THOU/UL (ref 4.8–10.8)

## 2018-01-27 RX ADMIN — HYDROCODONE BITARTRATE AND ACETAMINOPHEN PRN TAB: 5; 325 TABLET ORAL at 06:37

## 2018-01-27 RX ADMIN — HYDROCODONE BITARTRATE AND ACETAMINOPHEN PRN TAB: 5; 325 TABLET ORAL at 20:16

## 2018-01-27 RX ADMIN — METRONIDAZOLE SCH MLS: 500 INJECTION, SOLUTION INTRAVENOUS at 11:36

## 2018-01-27 RX ADMIN — Medication SCH: at 00:11

## 2018-01-27 RX ADMIN — HYDROCODONE BITARTRATE AND ACETAMINOPHEN PRN TAB: 5; 325 TABLET ORAL at 01:14

## 2018-01-27 RX ADMIN — MORPHINE SULFATE PRN MG: 5 INJECTION, SOLUTION INTRAMUSCULAR; INTRAVENOUS at 17:10

## 2018-01-27 RX ADMIN — HYDROCODONE BITARTRATE AND ACETAMINOPHEN PRN TAB: 5; 325 TABLET ORAL at 13:09

## 2018-01-27 RX ADMIN — METRONIDAZOLE SCH MLS: 500 INJECTION, SOLUTION INTRAVENOUS at 04:25

## 2018-01-27 RX ADMIN — Medication SCH: at 12:48

## 2018-01-27 RX ADMIN — METRONIDAZOLE SCH MLS: 500 INJECTION, SOLUTION INTRAVENOUS at 20:28

## 2018-01-28 RX ADMIN — HYDROCODONE BITARTRATE AND ACETAMINOPHEN PRN TAB: 5; 325 TABLET ORAL at 18:17

## 2018-01-28 RX ADMIN — Medication SCH: at 06:21

## 2018-01-28 RX ADMIN — HYDROCODONE BITARTRATE AND ACETAMINOPHEN PRN TAB: 5; 325 TABLET ORAL at 22:00

## 2018-01-28 RX ADMIN — HYDROCODONE BITARTRATE AND ACETAMINOPHEN PRN TAB: 5; 325 TABLET ORAL at 00:46

## 2018-01-28 RX ADMIN — Medication SCH: at 11:03

## 2018-01-28 RX ADMIN — HYDROCODONE BITARTRATE AND ACETAMINOPHEN PRN TAB: 5; 325 TABLET ORAL at 11:06

## 2018-01-28 RX ADMIN — METRONIDAZOLE SCH MLS: 500 INJECTION, SOLUTION INTRAVENOUS at 04:10

## 2018-01-28 RX ADMIN — METRONIDAZOLE SCH MLS: 500 INJECTION, SOLUTION INTRAVENOUS at 13:36

## 2018-01-28 NOTE — PRG
DATE OF SERVICE:  01/27/2018

 

TIME OF SERVICE:  1415 hours.

 

SUBJECTIVE:  The patient states that she feels about the same as she did yesterday.  She is somewhat 
anxious because of recent fever.  She denies nausea, vomiting or increased abdominal pain.

 

PHYSICAL EXAMINATION:

VITAL SIGNS:  The patient had a T-max of 100.1, now 99.5 without Tylenol.  Vital signs are otherwise 
unremarkable except from occasionally elevated systolic blood pressures.

GENERAL:  White female in no acute distress.

ABDOMEN:  Soft and nontender, bowel sounds are positive.  Her incision is intact and dry.

PELVIC:  The caleb were examined and there were no purulent, but were also noted to be dry and so the
y were removed without difficulty.  No purulent material or bleeding was noted after removal of the w
icks.  The balloon was intact.

EXTREMITIES:  Without clubbing, cyanosis or edema.

 

LABORATORY STUDIES:  Patient's white count is down to 10.7 from 11.0 on admission, 78% neutrophils, w
hich is essentially stable with admission, lymphocytes at 15%, which is essentially stable as well.  
She continues to have her reactive thrombocytosis.  Her hematocrit is 30.4% with hemoglobin of 9.9.  
Base met is within normal limits with a normal creatinine, potassium and sodium.  Glucoses within nor
mal limits as well.  Microbiology:  The patient with pelvic abscess and abdominal wound both grew E. 
coli along with non-hemolytic strep.  The E. coli is sensitive to current antibiotic therapy, piperac
illin/tazobactam.  Anaerobics are pending.

 

IMPRESSION:  Status post pelvic abscess with washout post total laparoscopic hysterectomy.  First fev
er during hospitalization approximately 48 hours post pelvic washout with appropriate antibiotic cove
rage with both Zosyn and Flagyl.

 

PLAN:  Continue antibiotics.  We will administer both Xanax for anxiety and clonidine p.r.n. for bloo
d pressure.  We will continue antibiotics and anticipate a repeat CT scan on the 01/29/2018 if the pa
tient remains afebrile prior to discharge to confirm resolution of abscess.

## 2018-01-28 NOTE — PRG
DATE OF SERVICE:  01/28/2018

 

TIME OF SERVICE:  12:50

 

SUBJECTIVE:  The patient reports that she feels much better today.  Small episode of bleeding after c
oughing after we removed the _____ yesterday.  It was addressed by Dr. Wagner, OB Hospitalist, on ca
ll with pressure and Steri-Strips.  The patient states that she has had no more bleeding, has a good 
appetite and only a small amount of vaginal discharge.

 

OBJECTIVE:

VITAL SIGNS:  T-max has been 100.1, and the patient has remained afebrile since noon on 01/27/2018.  
Her blood pressure remains elevated and currently is 180/89.

ABDOMEN:  Soft and nontender without rebound or guarding.  The suprapubic incision is dry.  There is 
moderate induration around it without significant erythema and no fluctuance.

EXTREMITIES:  Her extremities without clubbing, cyanosis, or edema.

 

LABORATORY DATA:  Culture is essentially unchanged since yesterday with anaerobic pending.

 

IMPRESSION:  The patient is doing well now approximately 12 days post-hysterectomy and 3 days post-pe
lvic washout with postoperative infection and abscess, on Zosyn and Flagyl.

 

PLAN:

1.  Continue antibiotics.

2.  Begin Bystolic 5 mg p.o. daily.  The patient reports that she has been having mildly elevated blo
od pressures even antecedent to this illness at home, and has a strong family history of elevated blo
od pressure.

3.  Consider likely we will go ahead and repeat CT scan without contrast on Monday, 01/29/2018, to re
assess change in pelvis status post washout and antibiotics compared to 4 days ago.

4.  Consideration for discharge home on 01/29/2018 if the patient remains afebrile.  Likely discharge
 medications would include Augmentin and Flagyl to complete a 14-day course.

## 2018-01-29 VITALS — TEMPERATURE: 98 F

## 2018-01-29 VITALS — SYSTOLIC BLOOD PRESSURE: 141 MMHG | DIASTOLIC BLOOD PRESSURE: 81 MMHG

## 2018-01-29 RX ADMIN — METRONIDAZOLE SCH MLS: 500 INJECTION, SOLUTION INTRAVENOUS at 00:02

## 2018-01-29 RX ADMIN — Medication SCH: at 00:03

## 2018-01-29 RX ADMIN — Medication SCH: at 08:30

## 2018-01-29 RX ADMIN — HYDROCODONE BITARTRATE AND ACETAMINOPHEN PRN TAB: 5; 325 TABLET ORAL at 11:09

## 2018-01-29 RX ADMIN — HYDROCODONE BITARTRATE AND ACETAMINOPHEN PRN TAB: 5; 325 TABLET ORAL at 04:07

## 2018-01-29 RX ADMIN — METRONIDAZOLE SCH MLS: 500 INJECTION, SOLUTION INTRAVENOUS at 09:13

## 2018-01-29 NOTE — CT
CT ABDOMEN AND PELVIS WITH CONTRAST:

 

HISTORY:

Urinary retention.  Prior bladder abscess.  The patient had a hysterectomy and surgery to remove flui
d.

 

COMPARISON:

01/24/2018

 

TECHNIQUE:

Multiple contiguous axial images were obtained in a CT of the abdomen and pelvis with contrast.  Randy
nal reformats were performed.

 

FINDINGS:

Skin staples are seen in the lower abdominal wall from interval surgery.  There are stranding changes
 in the abdominal wall with a few foci of air from recent surgery.  There are foci of free air in the
 abdomen from recent surgery.  There is a persistent fluid collection in the pelvis, containing foci 
of air.  This measures approximately 2.5 x 2.6 x 7 cm in size.  This is slightly smaller than the sierra
or examination.  This fluid collection is in the surgical bed, between the urinary bladder and the re
ctum.

 

The large and small bowel are normal in caliber.  The liver, gallbladder, right kidney, adrenal gland
s, spleen, and pancreas are unremarkable.  There is a nonobstructing cortical calcification in the lo
wer pole of the left kidney.  There are foci of air in the nondependent urinary bladder, which may be
 from recent instrumentation.  An infection is also a possibility.

 

No abdominal or pelvic lymphadenopathy is seen.  Degenerative changes are seen in the spine.  The vis
ualized inferior thorax is unremarkable.

 

IMPRESSION:

1.  Smaller but persistent fluid collection in the surgical bed.

 

2.  Air within the urinary bladder may be from recent instrumentation versus a urinary tract infectio
n.

 

3.  Nonobstructing cortical left renal calcification.

 

POS: JULIO

## 2024-05-31 NOTE — DIS
DATE OF ADMISSION:  01/24/2018

 

DATE OF DISCHARGE:  01/29/2018

 

SUMMARY OF HOSPITAL COURSE:  Ms. Whittaker was admitted on the 24th approximately 9 days status post
 total laparoscopic hysterectomy with right salpingo-oophorectomy, left salpingectomy with da Clau r
obot and in-bag morcellation of enlarged uterus.  Upon admission, initial concerns were for a urinary
 tract injury.  CT studies revealed no evidence of such; however, the patient was noted to have a pel
jose armando abscess as well as subcutaneous tissue abscess at the level of her suprapubic site of morcellatio
n.  She was taken to the operating room on the 25th and exploratory laparotomy with I&D of the abdomi
nal abscess as well as washout of the pelvic abscess was carried out at that time.  In the intervenin
g time afterwards, the patient responded well.  Her white count decreased from 11.8 upon admission to
 10.7 with a normal differential on the 27th.  Base met remained within normal limits.  Cultures of t
he abdominal abscesses revealed E. coli sensitive to both Augmentin and Zosyn and resistant to fluoro
quinolones.  Anaerobic were pending.  The patient had a T-max in the postoperative period of 100.1 on
 01/27/2018 at noon, since then she has been approximately 48 hours afebrile.  Of note, the patient h
ad moderately elevated systolic blood pressures, which she reported she was experiencing preoperative
ly in the last few months.  She was started on Bystolic 5 mg and had a decent response of her blood p
ressure to this.  Repeat CT scan on the 29th revealed a decrease in the size of the fluid collection 
in the pelvis.  It was felt that the majority of this fluid was left over from the pelvic washout.  T
he patient had a slight vaginal discharge, which is likely a small amount of drainage to the cuff, wh
ich is really the only area that access to this area of the fluid would be accessible.  No other abno
rmalities were noted.  The abdominal incision had been reclosed with spaced out staples and caleb, wh
ich were removed two days postoperatively.

 

The patient will be discharged home on Bystolic 5 mg p.o. q.a.m., Augmentin 875 b.i.d. and Flagyl 500
 t.i.d. x10 days.  She was also given a prescription for Norco 5.  She will be scheduled for followup
 in 9 days at [facility] [place] with Dr. Pablo Willingham.  The patient was instructed to amb
ulate, and to take her temperatures 3 times a day.  She is to call my office if she has a temperature
 greater than or equal to 100.2 or has increasing pelvic pain, myalgias, fatigue, etc. no